# Patient Record
Sex: FEMALE | Race: WHITE | NOT HISPANIC OR LATINO
[De-identification: names, ages, dates, MRNs, and addresses within clinical notes are randomized per-mention and may not be internally consistent; named-entity substitution may affect disease eponyms.]

---

## 2018-03-20 ENCOUNTER — APPOINTMENT (OUTPATIENT)
Dept: OBGYN | Facility: CLINIC | Age: 43
End: 2018-03-20
Payer: MEDICAID

## 2018-03-20 VITALS
DIASTOLIC BLOOD PRESSURE: 70 MMHG | WEIGHT: 177 LBS | SYSTOLIC BLOOD PRESSURE: 110 MMHG | HEIGHT: 65 IN | BODY MASS INDEX: 29.49 KG/M2

## 2018-03-20 DIAGNOSIS — N93.9 ABNORMAL UTERINE AND VAGINAL BLEEDING, UNSPECIFIED: ICD-10-CM

## 2018-03-20 LAB
BILIRUB UR QL STRIP: NORMAL
GLUCOSE UR-MCNC: NORMAL
HCG UR QL: NORMAL EU/DL
HGB UR QL STRIP.AUTO: 250
KETONES UR-MCNC: NORMAL
LEUKOCYTE ESTERASE UR QL STRIP: 75
NITRITE UR QL STRIP: NORMAL
PH UR STRIP: 5
PROT UR STRIP-MCNC: NORMAL
SP GR UR STRIP: 1.02

## 2018-03-20 PROCEDURE — 81003 URINALYSIS AUTO W/O SCOPE: CPT | Mod: QW

## 2018-03-20 PROCEDURE — 99203 OFFICE O/P NEW LOW 30 MIN: CPT

## 2018-03-20 PROCEDURE — 76830 TRANSVAGINAL US NON-OB: CPT

## 2018-03-20 PROCEDURE — 76856 US EXAM PELVIC COMPLETE: CPT

## 2018-05-15 ENCOUNTER — RESULT REVIEW (OUTPATIENT)
Age: 43
End: 2018-05-15

## 2018-07-16 ENCOUNTER — APPOINTMENT (OUTPATIENT)
Dept: OBGYN | Facility: CLINIC | Age: 43
End: 2018-07-16
Payer: MEDICAID

## 2018-07-16 VITALS — DIASTOLIC BLOOD PRESSURE: 80 MMHG | BODY MASS INDEX: 29.62 KG/M2 | SYSTOLIC BLOOD PRESSURE: 130 MMHG | WEIGHT: 178 LBS

## 2018-07-16 DIAGNOSIS — S30.814A ABRASION OF VAGINA AND VULVA, INITIAL ENCOUNTER: ICD-10-CM

## 2018-07-16 PROCEDURE — 99213 OFFICE O/P EST LOW 20 MIN: CPT

## 2018-07-16 RX ORDER — NORETHINDRONE ACETATE 5 MG/1
5 TABLET ORAL DAILY
Qty: 21 | Refills: 0 | Status: ACTIVE | COMMUNITY
Start: 2018-07-16 | End: 1900-01-01

## 2018-07-19 ENCOUNTER — OUTPATIENT (OUTPATIENT)
Dept: OUTPATIENT SERVICES | Facility: HOSPITAL | Age: 43
LOS: 1 days | Discharge: HOME | End: 2018-07-19

## 2018-07-19 VITALS — HEIGHT: 65 IN | WEIGHT: 175.05 LBS

## 2018-07-19 DIAGNOSIS — N84.0 POLYP OF CORPUS UTERI: ICD-10-CM

## 2018-07-19 DIAGNOSIS — Z01.818 ENCOUNTER FOR OTHER PREPROCEDURAL EXAMINATION: ICD-10-CM

## 2018-07-19 DIAGNOSIS — N93.9 ABNORMAL UTERINE AND VAGINAL BLEEDING, UNSPECIFIED: ICD-10-CM

## 2018-07-19 LAB
ALBUMIN SERPL ELPH-MCNC: 4.2 G/DL — SIGNIFICANT CHANGE UP (ref 3.5–5.2)
ALP SERPL-CCNC: 66 U/L — SIGNIFICANT CHANGE UP (ref 30–115)
ALT FLD-CCNC: 14 U/L — SIGNIFICANT CHANGE UP (ref 0–41)
ANION GAP SERPL CALC-SCNC: 13 MMOL/L — SIGNIFICANT CHANGE UP (ref 7–14)
APPEARANCE UR: CLEAR — SIGNIFICANT CHANGE UP
APTT BLD: 31.6 SEC — SIGNIFICANT CHANGE UP (ref 27–39.2)
AST SERPL-CCNC: 16 U/L — SIGNIFICANT CHANGE UP (ref 0–41)
BASOPHILS # BLD AUTO: 0.03 K/UL — SIGNIFICANT CHANGE UP (ref 0–0.2)
BASOPHILS NFR BLD AUTO: 0.4 % — SIGNIFICANT CHANGE UP (ref 0–1)
BILIRUB SERPL-MCNC: 0.4 MG/DL — SIGNIFICANT CHANGE UP (ref 0.2–1.2)
BILIRUB UR-MCNC: NEGATIVE — SIGNIFICANT CHANGE UP
BUN SERPL-MCNC: 9 MG/DL — LOW (ref 10–20)
CALCIUM SERPL-MCNC: 9.6 MG/DL — SIGNIFICANT CHANGE UP (ref 8.5–10.1)
CHLORIDE SERPL-SCNC: 103 MMOL/L — SIGNIFICANT CHANGE UP (ref 98–110)
CO2 SERPL-SCNC: 25 MMOL/L — SIGNIFICANT CHANGE UP (ref 17–32)
COLOR SPEC: YELLOW — SIGNIFICANT CHANGE UP
CREAT SERPL-MCNC: 0.8 MG/DL — SIGNIFICANT CHANGE UP (ref 0.7–1.5)
DIFF PNL FLD: NEGATIVE — SIGNIFICANT CHANGE UP
EOSINOPHIL # BLD AUTO: 0.1 K/UL — SIGNIFICANT CHANGE UP (ref 0–0.7)
EOSINOPHIL NFR BLD AUTO: 1.5 % — SIGNIFICANT CHANGE UP (ref 0–8)
GLUCOSE SERPL-MCNC: 92 MG/DL — SIGNIFICANT CHANGE UP (ref 70–99)
GLUCOSE UR QL: NEGATIVE — SIGNIFICANT CHANGE UP
HCT VFR BLD CALC: 40.3 % — SIGNIFICANT CHANGE UP (ref 37–47)
HGB BLD-MCNC: 13.2 G/DL — SIGNIFICANT CHANGE UP (ref 12–16)
IMM GRANULOCYTES NFR BLD AUTO: 0.3 % — SIGNIFICANT CHANGE UP (ref 0.1–0.3)
INR BLD: 1.06 RATIO — SIGNIFICANT CHANGE UP (ref 0.65–1.3)
KETONES UR-MCNC: NEGATIVE — SIGNIFICANT CHANGE UP
LEUKOCYTE ESTERASE UR-ACNC: NEGATIVE — SIGNIFICANT CHANGE UP
LYMPHOCYTES # BLD AUTO: 2.41 K/UL — SIGNIFICANT CHANGE UP (ref 1.2–3.4)
LYMPHOCYTES # BLD AUTO: 35.6 % — SIGNIFICANT CHANGE UP (ref 20.5–51.1)
MCHC RBC-ENTMCNC: 28 PG — SIGNIFICANT CHANGE UP (ref 27–31)
MCHC RBC-ENTMCNC: 32.8 G/DL — SIGNIFICANT CHANGE UP (ref 32–37)
MCV RBC AUTO: 85.4 FL — SIGNIFICANT CHANGE UP (ref 81–99)
MONOCYTES # BLD AUTO: 0.45 K/UL — SIGNIFICANT CHANGE UP (ref 0.1–0.6)
MONOCYTES NFR BLD AUTO: 6.6 % — SIGNIFICANT CHANGE UP (ref 1.7–9.3)
NEUTROPHILS # BLD AUTO: 3.76 K/UL — SIGNIFICANT CHANGE UP (ref 1.4–6.5)
NEUTROPHILS NFR BLD AUTO: 55.6 % — SIGNIFICANT CHANGE UP (ref 42.2–75.2)
NITRITE UR-MCNC: NEGATIVE — SIGNIFICANT CHANGE UP
NRBC # BLD: 0 /100 WBCS — SIGNIFICANT CHANGE UP (ref 0–0)
PH UR: 6.5 — SIGNIFICANT CHANGE UP (ref 5–8)
PLATELET # BLD AUTO: 245 K/UL — SIGNIFICANT CHANGE UP (ref 130–400)
POTASSIUM SERPL-MCNC: 4.1 MMOL/L — SIGNIFICANT CHANGE UP (ref 3.5–5)
POTASSIUM SERPL-SCNC: 4.1 MMOL/L — SIGNIFICANT CHANGE UP (ref 3.5–5)
PROT SERPL-MCNC: 6.9 G/DL — SIGNIFICANT CHANGE UP (ref 6–8)
PROT UR-MCNC: NEGATIVE — SIGNIFICANT CHANGE UP
PROTHROM AB SERPL-ACNC: 11.4 SEC — SIGNIFICANT CHANGE UP (ref 9.95–12.87)
RBC # BLD: 4.72 M/UL — SIGNIFICANT CHANGE UP (ref 4.2–5.4)
RBC # FLD: 13.1 % — SIGNIFICANT CHANGE UP (ref 11.5–14.5)
SODIUM SERPL-SCNC: 141 MMOL/L — SIGNIFICANT CHANGE UP (ref 135–146)
SP GR SPEC: 1.02 — SIGNIFICANT CHANGE UP (ref 1.01–1.03)
UROBILINOGEN FLD QL: 0.2 — SIGNIFICANT CHANGE UP (ref 0.2–0.2)
WBC # BLD: 6.77 K/UL — SIGNIFICANT CHANGE UP (ref 4.8–10.8)
WBC # FLD AUTO: 6.77 K/UL — SIGNIFICANT CHANGE UP (ref 4.8–10.8)

## 2018-07-19 NOTE — H&P PST ADULT - HISTORY OF PRESENT ILLNESS
PATIENT CURRENTLY DENIES CHEST PAIN  SHORTNESS OF BREATH  PALPITATIONS,  DYSURIA, OR UPPER RESPIRATORY INFECTION IN PAST 2 WEEKS  EXERCISE  TOLERANCE  1-2 FLIGHT OF STAIRS  WITHOUT SHORTNESS OF BREATH

## 2018-07-19 NOTE — H&P PST ADULT - ATTENDING COMMENTS
43yo with abnormal uterine bleeding and uterine polyp who desires contraception in the form of an IUD.  Difficult exam in the office with poor visualization of the cervix due to excess vaginal tissue.   Presents today for PAP, hysteroscopy with polypectomy, D&C and Mirena IUD insertion.

## 2018-07-30 ENCOUNTER — LABORATORY RESULT (OUTPATIENT)
Age: 43
End: 2018-07-30

## 2018-07-30 ENCOUNTER — OUTPATIENT (OUTPATIENT)
Dept: OUTPATIENT SERVICES | Facility: HOSPITAL | Age: 43
LOS: 1 days | Discharge: HOME | End: 2018-07-30
Payer: MEDICAID

## 2018-07-30 ENCOUNTER — RESULT REVIEW (OUTPATIENT)
Age: 43
End: 2018-07-30

## 2018-07-30 ENCOUNTER — OUTPATIENT (OUTPATIENT)
Dept: OUTPATIENT SERVICES | Facility: HOSPITAL | Age: 43
LOS: 1 days | Discharge: HOME | End: 2018-07-30

## 2018-07-30 VITALS
SYSTOLIC BLOOD PRESSURE: 117 MMHG | HEART RATE: 72 BPM | RESPIRATION RATE: 16 BRPM | DIASTOLIC BLOOD PRESSURE: 80 MMHG | OXYGEN SATURATION: 99 %

## 2018-07-30 VITALS — OXYGEN SATURATION: 100 % | HEART RATE: 72 BPM | RESPIRATION RATE: 16 BRPM | TEMPERATURE: 98 F

## 2018-07-30 PROCEDURE — 58300 INSERT INTRAUTERINE DEVICE: CPT

## 2018-07-30 PROCEDURE — 58558 HYSTEROSCOPY BIOPSY: CPT

## 2018-07-30 RX ORDER — MORPHINE SULFATE 50 MG/1
2 CAPSULE, EXTENDED RELEASE ORAL
Qty: 0 | Refills: 0 | Status: DISCONTINUED | OUTPATIENT
Start: 2018-07-30 | End: 2018-07-30

## 2018-07-30 RX ORDER — NORETHINDRONE 0.35 MG/1
1 TABLET ORAL
Qty: 0 | Refills: 0 | COMMUNITY

## 2018-07-30 RX ORDER — KETOROLAC TROMETHAMINE 30 MG/ML
30 SYRINGE (ML) INJECTION ONCE
Qty: 0 | Refills: 0 | Status: DISCONTINUED | OUTPATIENT
Start: 2018-07-30 | End: 2018-07-30

## 2018-07-30 RX ORDER — OXYCODONE AND ACETAMINOPHEN 5; 325 MG/1; MG/1
2 TABLET ORAL EVERY 6 HOURS
Qty: 0 | Refills: 0 | Status: DISCONTINUED | OUTPATIENT
Start: 2018-07-30 | End: 2018-07-30

## 2018-07-30 RX ORDER — SODIUM CHLORIDE 9 MG/ML
1000 INJECTION, SOLUTION INTRAVENOUS
Qty: 0 | Refills: 0 | Status: DISCONTINUED | OUTPATIENT
Start: 2018-07-30 | End: 2018-08-14

## 2018-07-30 RX ORDER — ONDANSETRON 8 MG/1
4 TABLET, FILM COATED ORAL ONCE
Qty: 0 | Refills: 0 | Status: DISCONTINUED | OUTPATIENT
Start: 2018-07-30 | End: 2018-08-14

## 2018-07-30 RX ADMIN — SODIUM CHLORIDE 100 MILLILITER(S): 9 INJECTION, SOLUTION INTRAVENOUS at 10:25

## 2018-07-30 NOTE — ASU DISCHARGE PLAN (ADULT/PEDIATRIC). - ACTIVITY LEVEL
no tub baths/no heavy lifting/nothing per vagina/no intercourse/nothing per rectum/no tampons/no douching

## 2018-07-30 NOTE — BRIEF OPERATIVE NOTE - PROCEDURE
<<-----Click on this checkbox to enter Procedure Pap smear  07/30/2018    Active  KIRILL  IUD insertion  07/30/2018    Active  KIRILL  Hysteroscopic polypectomy using MyoSure tissue removal system  07/30/2018    Active  KIRILL  Hysteroscopy with dilation and curettage of uterus  07/30/2018    Active  KIRILL

## 2018-07-30 NOTE — ASU DISCHARGE PLAN (ADULT/PEDIATRIC). - SPECIAL INSTRUCTIONS
- Nothing in the vagina for 2 weeks - no sex, tampons, douching, tub baths or pools. May Shower.  - Follow up with Dr. Liu in 2 weeks  -In case of fever 100.4 or over, excessive bleeding, severe pain uncontrolled with meds, or leg pain, redness or swelling, please go to the emergency department

## 2018-07-30 NOTE — BRIEF OPERATIVE NOTE - OPERATION/FINDINGS
On exam, a cystocele and a rectocele with a normal looking cervix were noted. A small anteverted uterus was palpated. On hysteroscopy, an erythematous uterine cavity, with polypy looking protrusions were seen. Both ostia visualized. On exam, a cystocele and a rectocele with a normal looking cervix were noted. A small anteverted uterus was palpated. On hysteroscopy, an erythematous uterine cavity, with wispy pieces of tissue protruding into the cavity. Both ostia visualized.

## 2018-07-30 NOTE — BRIEF OPERATIVE NOTE - PRE-OP DX
Abnormal uterine bleeding (AUB)  07/30/2018    Active  Pepper Urbina  Endometrial polyp  07/30/2018    Active  Pepper Urbina

## 2018-07-30 NOTE — ASU DISCHARGE PLAN (ADULT/PEDIATRIC). - ASU FOLLOWUP
911 or go to the nearest Emergency Room [facility]:  [place] for Ambulatory Surgery...

## 2018-07-31 DIAGNOSIS — Z00.00 ENCOUNTER FOR GENERAL ADULT MEDICAL EXAMINATION WITHOUT ABNORMAL FINDINGS: ICD-10-CM

## 2018-08-01 PROBLEM — K21.9 GASTRO-ESOPHAGEAL REFLUX DISEASE WITHOUT ESOPHAGITIS: Chronic | Status: ACTIVE | Noted: 2018-07-19

## 2018-08-01 LAB — SURGICAL PATHOLOGY STUDY: SIGNIFICANT CHANGE UP

## 2018-08-02 DIAGNOSIS — N84.0 POLYP OF CORPUS UTERI: ICD-10-CM

## 2018-08-02 DIAGNOSIS — N93.9 ABNORMAL UTERINE AND VAGINAL BLEEDING, UNSPECIFIED: ICD-10-CM

## 2018-08-02 DIAGNOSIS — D25.0 SUBMUCOUS LEIOMYOMA OF UTERUS: ICD-10-CM

## 2018-08-02 DIAGNOSIS — Z30.430 ENCOUNTER FOR INSERTION OF INTRAUTERINE CONTRACEPTIVE DEVICE: ICD-10-CM

## 2018-08-15 ENCOUNTER — OUTPATIENT (OUTPATIENT)
Dept: OUTPATIENT SERVICES | Facility: HOSPITAL | Age: 43
LOS: 1 days | Discharge: HOME | End: 2018-08-15

## 2018-08-15 ENCOUNTER — APPOINTMENT (OUTPATIENT)
Dept: OBGYN | Facility: CLINIC | Age: 43
End: 2018-08-15
Payer: MEDICAID

## 2018-08-15 VITALS — BODY MASS INDEX: 29.45 KG/M2 | WEIGHT: 177 LBS

## 2018-08-15 DIAGNOSIS — Z09 ENCOUNTER FOR FOLLOW-UP EXAMINATION AFTER COMPLETED TREATMENT FOR CONDITIONS OTHER THAN MALIGNANT NEOPLASM: ICD-10-CM

## 2018-08-15 PROCEDURE — 99212 OFFICE O/P EST SF 10 MIN: CPT

## 2018-08-26 DIAGNOSIS — Z00.00 ENCOUNTER FOR GENERAL ADULT MEDICAL EXAMINATION WITHOUT ABNORMAL FINDINGS: ICD-10-CM

## 2019-02-14 ENCOUNTER — APPOINTMENT (OUTPATIENT)
Dept: OBGYN | Facility: CLINIC | Age: 44
End: 2019-02-14
Payer: MEDICAID

## 2019-02-14 PROCEDURE — 76830 TRANSVAGINAL US NON-OB: CPT

## 2019-03-18 DIAGNOSIS — Z00.00 ENCOUNTER FOR GENERAL ADULT MEDICAL EXAMINATION W/OUT ABNORMAL FINDINGS: ICD-10-CM

## 2019-06-28 ENCOUNTER — RX RENEWAL (OUTPATIENT)
Age: 44
End: 2019-06-28

## 2019-07-17 ENCOUNTER — RX RENEWAL (OUTPATIENT)
Age: 44
End: 2019-07-17

## 2019-07-17 RX ORDER — NORETHINDRONE ACETATE AND ETHINYL ESTRADIOL AND FERROUS FUMARATE 1.5-30(21)
1.5-3 KIT ORAL DAILY
Qty: 84 | Refills: 1 | Status: ACTIVE | COMMUNITY
Start: 2019-06-28 | End: 1900-01-01

## 2020-01-29 ENCOUNTER — APPOINTMENT (OUTPATIENT)
Dept: OBGYN | Facility: CLINIC | Age: 45
End: 2020-01-29

## 2020-01-29 LAB
BILIRUB UR QL STRIP: NORMAL
GLUCOSE UR-MCNC: NORMAL
HCG UR QL: NORMAL EU/DL
HGB UR QL STRIP.AUTO: NORMAL
KETONES UR-MCNC: NORMAL
LEUKOCYTE ESTERASE UR QL STRIP: NORMAL
NITRITE UR QL STRIP: NORMAL
PH UR STRIP: 6
PROT UR STRIP-MCNC: NORMAL
SP GR UR STRIP: 1.02

## 2021-06-20 ENCOUNTER — EMERGENCY (EMERGENCY)
Facility: HOSPITAL | Age: 46
LOS: 0 days | Discharge: HOME | End: 2021-06-20
Attending: EMERGENCY MEDICINE | Admitting: EMERGENCY MEDICINE
Payer: MEDICAID

## 2021-06-20 VITALS
SYSTOLIC BLOOD PRESSURE: 150 MMHG | RESPIRATION RATE: 18 BRPM | TEMPERATURE: 98 F | HEIGHT: 65 IN | DIASTOLIC BLOOD PRESSURE: 100 MMHG | HEART RATE: 96 BPM | OXYGEN SATURATION: 98 %

## 2021-06-20 VITALS
HEART RATE: 69 BPM | DIASTOLIC BLOOD PRESSURE: 85 MMHG | RESPIRATION RATE: 18 BRPM | OXYGEN SATURATION: 96 % | TEMPERATURE: 98 F | SYSTOLIC BLOOD PRESSURE: 123 MMHG

## 2021-06-20 DIAGNOSIS — S62.616A DISPLACED FRACTURE OF PROXIMAL PHALANX OF RIGHT LITTLE FINGER, INITIAL ENCOUNTER FOR CLOSED FRACTURE: ICD-10-CM

## 2021-06-20 DIAGNOSIS — S62.614A DISPLACED FRACTURE OF PROXIMAL PHALANX OF RIGHT RING FINGER, INITIAL ENCOUNTER FOR CLOSED FRACTURE: ICD-10-CM

## 2021-06-20 DIAGNOSIS — S80.212A ABRASION, LEFT KNEE, INITIAL ENCOUNTER: ICD-10-CM

## 2021-06-20 DIAGNOSIS — K21.9 GASTRO-ESOPHAGEAL REFLUX DISEASE WITHOUT ESOPHAGITIS: ICD-10-CM
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
## 2021-06-20 DIAGNOSIS — Z88.8 ALLERGY STATUS TO OTHER DRUGS, MEDICAMENTS AND BIOLOGICAL SUBSTANCES STATUS: ICD-10-CM
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
## 2021-06-20 DIAGNOSIS — M79.641 PAIN IN RIGHT HAND: ICD-10-CM

## 2021-06-20 PROCEDURE — 73110 X-RAY EXAM OF WRIST: CPT | Mod: 26,RT

## 2021-06-20 PROCEDURE — 73560 X-RAY EXAM OF KNEE 1 OR 2: CPT | Mod: 26,LT

## 2021-06-20 PROCEDURE — 99284 EMERGENCY DEPT VISIT MOD MDM: CPT

## 2021-06-20 PROCEDURE — 73060 X-RAY EXAM OF HUMERUS: CPT | Mod: 26,RT

## 2021-06-20 PROCEDURE — 73090 X-RAY EXAM OF FOREARM: CPT | Mod: 26,RT

## 2021-06-20 PROCEDURE — 73130 X-RAY EXAM OF HAND: CPT | Mod: 26,RT

## 2021-06-20 PROCEDURE — 73080 X-RAY EXAM OF ELBOW: CPT | Mod: 26,RT

## 2021-06-20 PROCEDURE — 73130 X-RAY EXAM OF HAND: CPT | Mod: 26,RT,77

## 2021-06-20 RX ORDER — HYDROMORPHONE HYDROCHLORIDE 2 MG/ML
0.5 INJECTION INTRAMUSCULAR; INTRAVENOUS; SUBCUTANEOUS ONCE
Refills: 0 | Status: DISCONTINUED | OUTPATIENT
Start: 2021-06-20 | End: 2021-06-20

## 2021-06-20 RX ORDER — MORPHINE SULFATE 50 MG/1
4 CAPSULE, EXTENDED RELEASE ORAL ONCE
Refills: 0 | Status: DISCONTINUED | OUTPATIENT
Start: 2021-06-20 | End: 2021-06-20

## 2021-06-20 RX ORDER — AMOXICILLIN 250 MG/5ML
1 SUSPENSION, RECONSTITUTED, ORAL (ML) ORAL
Qty: 14 | Refills: 0
Start: 2021-06-20 | End: 2021-06-26

## 2021-06-20 RX ORDER — HYDROMORPHONE HYDROCHLORIDE 2 MG/ML
1 INJECTION INTRAMUSCULAR; INTRAVENOUS; SUBCUTANEOUS ONCE
Refills: 0 | Status: DISCONTINUED | OUTPATIENT
Start: 2021-06-20 | End: 2021-06-20

## 2021-06-20 RX ADMIN — MORPHINE SULFATE 4 MILLIGRAM(S): 50 CAPSULE, EXTENDED RELEASE ORAL at 14:12

## 2021-06-20 RX ADMIN — HYDROMORPHONE HYDROCHLORIDE 0.5 MILLIGRAM(S): 2 INJECTION INTRAMUSCULAR; INTRAVENOUS; SUBCUTANEOUS at 15:52

## 2021-06-20 RX ADMIN — HYDROMORPHONE HYDROCHLORIDE 1 MILLIGRAM(S): 2 INJECTION INTRAMUSCULAR; INTRAVENOUS; SUBCUTANEOUS at 17:26

## 2021-06-20 NOTE — CONSULT NOTE ADULT - CONSULT REASON
fractures to right ring finger and small finger
RIGHT COMMINUTED FOURTH PROXIMAL PHALANX FRACTURE AND ANGULATED FIFTH PROMIXAL PHALANX FRACTURE

## 2021-06-20 NOTE — ED PROVIDER NOTE - OBJECTIVE STATEMENT
46 y/lo female with no significant PMH presents to the ED for evaluation of right hand pain s/p tripping and falling landing on outstretched hands while playing baseball earlier today. pt is left hand dominant. pt reports tingling in her right hand. pt denies mild left knee pain. pt was given fentanyl by EMS with temporary relief. pt denies loc, head injury, headache, weakness, or numbness.

## 2021-06-20 NOTE — PROCEDURE NOTE - NSICDXPROCEDURE_GEN_ALL_CORE_FT
PROCEDURES:  Closed reduction of fracture of distal phalanx of hand 20-Jun-2021 19:54:14 4th and 5th digits Selma Bobo

## 2021-06-20 NOTE — ED PROVIDER NOTE - NS ED ROS FT
CONST: No fever, chills or bodyaches  EYES: No pain, redness, drainage or visual changes.  ENT: No ear pain or discharge, nasal discharge or congestion. No sore throat  CARD: No chest pain, palpitations  RESP: No SOB, cough, hemoptysis. No hx of asthma or COPD  GI: No abdominal pain, N/V/D  : No urinary symptoms  MS: (+) right hand pain   SKIN: No rashes  NEURO: No headache, dizziness, paresthesias or LOC

## 2021-06-20 NOTE — ED PROVIDER NOTE - PHYSICAL EXAMINATION
Physical Exam    Vital Signs: I have reviewed the initial vital signs.  Constitutional: well-nourished, appears stated age, no acute distress  Eyes: Conjunctiva pink, Sclera clear  Cardiovascular: S1 and S2, regular rate, regular rhythm, well-perfused extremities, radial pulses equal and 2+ b/l. good capillary refill <2 seconds.   Respiratory: unlabored respiratory effort, clear to auscultation bilaterally no wheezing, rales and rhonchi. pt is speaking full sentences. no accessory muscle use.   Gastrointestinal: soft, non-tender, nondistended abdomen, no pulsatile mass, normal bowl sounds, no rebound, no guarding, no organomegaly.   Musculoskeletal: (+) deformity of the right 4th phalange with ventral deviation of the right 4th finger. ecchymosis to the palmar aspect of 3-5th phalanges. tenderness over fingers 3-5. limited rom of the right digits due to pain.   Integumentary: warm, dry, no rash  Neurologic: awake, alert, cranial nerves II-XII grossly intact, extremities’ motor and sensory functions. median, radial, and ulnar nerve motor and senory function intact b/l; however limited motor function in the right last 3 digits due to pain.   Psychiatric: appropriate mood, appropriate affect

## 2021-06-20 NOTE — ED PROVIDER NOTE - CARE PROVIDER_API CALL
Kristi Wells)  [facility]  [address] [place] [address], [address]  [place] [address]  Phone: [phone]  Fax: [phone]  Follow Up Time: 1-3 Days

## 2021-06-20 NOTE — ED PROVIDER NOTE - PATIENT PORTAL LINK FT
You can access the FollowMyHealth Patient Portal offered by [facility] by registering at the following website: [email or web address]. By joining [employer]’s FollowMyHealth portal, you will also be able to view your health information using other applications (apps) compatible with our system.

## 2021-06-20 NOTE — ED PROVIDER NOTE - CARE PROVIDERS DIRECT ADDRESSES
[email or web address]

## 2021-06-20 NOTE — CONSULT NOTE ADULT - ATTENDING COMMENTS
d/w surgery consult resident  recommend ED closed reduction and splint immobilization  hand elevation and pain control  follow up in [facility] office 6/21/21 for surgical planning

## 2021-06-20 NOTE — CONSULT NOTE ADULT - SUBJECTIVE AND OBJECTIVE BOX
Orthopaedics Consult Note    ALEXANDER RIOS  [id number]  Ph:  [phone]      Patient is a 46y old LHD Female with right hand pain after sliding into base while playing softball earlier today. Reported immediate pain and deformity at ring finger and small finger.  General surgery called while initially on hand call, however, ortho called to assist. Isolated injury, denies pain elsewhere in extremity. Denies numbness/tingling/radiating pain.      PMH/PSH  GERD (gastroesophageal reflux disease)    Finger fracture, right    No significant past surgical history    R HAND ARM INJURY    Finger dislocation, initial encounter    SysAdmin_VisitLink        Medications      Allergies  No Known Allergies  steroids - heart races (Other)        T(C): 36.4 (06-20-21 @ 15:23), Max: 36.7 (06-20-21 @ 13:02)  HR: 69 (06-20-21 @ 15:23) (69 - 96)  BP: 123/85 (06-20-21 @ 15:23) (123/85 - 150/100)  RR: 18 (06-20-21 @ 15:23) (18 - 18)  SpO2: 96% (06-20-21 @ 15:23) (96% - 98%)    Physical Exam  NAD  Breathing comfortably on RA  Resting comfortably    RUE  skin intact  (+) swelling and ecchymosis at RF/SF proximal phalanx and MCP  (+)malrotation at RF/SF  unable to make full composite fist  Motor: AIN/PIN/Ulnar intact  Sensory: Ax/M/R/U intact all fingers  Vasc: hand WWP, palpable radial pulse, CR<2sec    LUE XR:  fractures at proximal phalanx of ring finger and small finger; RF small finger prox phalanx fracture extending intra-articularly with significant dorsal angulation; SF prox phalanx transverse fracture w/ mild dorsal angulation    Procedure  Ulnar nerve block was performed and Left RF/SF proximal phalanx fractures were closed reduced and intrinsic plus splint applied    A/P: Patient is a 46year old RHD Female with proximal phalanx fractures to the left ring finger and small finger; ortho called to assist w/ closed reduction for general surgery patient    -s/p closed reduction and splinting as above  - splint care instructions provided; must keep clean/dry/intact  - nonweightbearing to LUE  - patient counseled that her fractures are unstable and will likely require operative fixation in order to restore stability, improve function, and reduce pain.  This can be further discussed and performed on an outpatient basis  - pain control PRN  - ice/elevation  - may follow up as outpatient w/ Dr. Wells (Plastic and Reconstructive Surgery) this week, must call  for appointment  - if patient desires, she may f/u with Dr. Aaron Haque or Dr. Shanda Cortes (Orthopedic Hand Surgery) this week, must call  for appointment

## 2021-06-20 NOTE — CONSULT NOTE ADULT - ASSESSMENT
ASSESSMENT:  46yF w/ PMHx of  fall who presented with right hand pain found to have Acute displaced intra-articular comminuted fracture of the fourth proximal phalanx, and acute angulated fracture of the fifth proximal phalanx.    PLAN:  -4th and 5th digit fractures reduced at bedside   -Intrinsic plus volar splint with dorsal support placed   -Post reduction films demonstrate realignment of the digits   -Patient should follow-up in Dr. Wells's office tomorrow, 6/21, for evaluation and plan for surgical repair  -Adequate pain control on discharge   -PO antibiotics   -Keep RUE elevated     Above plan discussed with Attending Surgeon Dr. Wells , patient, patient family, and ED

## 2021-06-20 NOTE — ED PROVIDER NOTE - CLINICAL SUMMARY MEDICAL DECISION MAKING FREE TEXT BOX
pt seen by hand c/s, who asked ortho to assist with reduction and splint, dc home w f/u Dr Wells tomorrow, ger c/s rec dc home w abx. pain meds also sent. strict return precautions provided

## 2021-06-20 NOTE — CONSULT NOTE ADULT - SUBJECTIVE AND OBJECTIVE BOX
GENERAL SURGERY CONSULT NOTE    Patient: ALEXANDER RIOS , 46y ([birth year])Female   MRN: [id number]  Location: [facility] ED  Visit: 06-20-21 Emergency  Date: 06-20-21 @ 18:48    HPI: 46 year old female who fell on outstretched hand while playing baseball complains of right 4th and 5th digit pain, she states when she was sliding forward on her hands she saw the fingers dislocate.        PAST MEDICAL & SURGICAL HISTORY:  GERD (gastroesophageal reflux disease)    No significant past surgical history      VITALS:  T(F): 97.6 (06-20-21 @ 15:23), Max: 98 (06-20-21 @ 13:02)  HR: 69 (06-20-21 @ 15:23) (69 - 96)  BP: 123/85 (06-20-21 @ 15:23) (123/85 - 150/100)  RR: 18 (06-20-21 @ 15:23) (18 - 18)  SpO2: 96% (06-20-21 @ 15:23) (96% - 98%)    PHYSICAL EXAM:  General: NAD, AAOx3, calm and cooperative  HEENT: NCAT, EOMI, Trachea ML, Neck supple  Respiratory:  normal respiratory effort,   Musculoskeletal: right 4th and 5th digit base with significant swelling, ecchymosis and possible dislocation, tender to touch, capillary refill intact, ROM limited secondary to pain and swelling  Neuro: Sensation grossly intact and equal throughout, no focal deficits  Vascular: Pulses 2+ throughout, extremities well perfused      LAB/STUDIES:    IMAGING:    r< from: Xray Hand 3 Views, Right (06.20.21 @ 14:26) >  Impression:  Acute displaced intra-articular comminuted fracture of the fourth proximal phalanx, and acute angulated fracture of the fifth proximal phalanx.    < end of copied text >        ASSESSMENT:  46yF w/ PMHx of  fall who presented with right hand pain found to have Acute displaced intra-articular comminuted fracture of the fourth proximal phalanx, and acute angulated fracture of the fifth proximal phalanx.    PLAN:  -to reduce and splint at bedside  -f/u post reduction films   -    Above plan discussed with Attending Surgeon Dr. Wells , patient, patient family, and ED  06-20-21 @ 18:48   GENERAL SURGERY CONSULT NOTE    Patient: ALEXANDER RIOS , 46y ([birth year])Female   MRN: [id number]  Location: [facility] ED  Visit: 06-20-21 Emergency  Date: 06-20-21 @ 18:48    HPI: 46 year old female who fell on outstretched hand while playing baseball complains of right 4th and 5th digit pain, she states when she was sliding forward on her hands she saw the fingers dislocate.      PAST MEDICAL & SURGICAL HISTORY:  GERD (gastroesophageal reflux disease)    No significant past surgical history    VITALS:  T(F): 97.6 (06-20-21 @ 15:23), Max: 98 (06-20-21 @ 13:02)  HR: 69 (06-20-21 @ 15:23) (69 - 96)  BP: 123/85 (06-20-21 @ 15:23) (123/85 - 150/100)  RR: 18 (06-20-21 @ 15:23) (18 - 18)  SpO2: 96% (06-20-21 @ 15:23) (96% - 98%)    PHYSICAL EXAM:  General: NAD, AAOx3, calm and cooperative  HEENT: NCAT, EOMI, Trachea ML, Neck supple  Respiratory:  normal respiratory effort,   Musculoskeletal: right 4th and 5th digit base with significant swelling, ecchymosis and possible dislocation, tender to touch, capillary refill intact, ROM limited secondary to pain and swelling  Neuro: Sensation grossly intact and equal throughout, no focal deficits  Vascular: Pulses 2+ throughout, extremities well perfused    IMAGING:  < from: Xray Hand 3 Views, Right (06.20.21 @ 14:26) >  Impression:  Acute displaced intra-articular comminuted fracture of the fourth proximal phalanx, and acute angulated fracture of the fifth proximal phalanx.

## 2021-06-20 NOTE — ED PROVIDER NOTE - PROGRESS NOTE DETAILS
ortho recommends hand surg consult. gen surg covering hand consulted. FF: pt seen by gen surg and orthopedics. fingers reduced, and pt placed in sugar tong splint. pt has an appt with dr. quintanilla in the office tomorrow. rx pain medications. rx abx recommended by hand. advised of strict return precautions discussed at bedside. agreeable to dc.

## 2021-06-20 NOTE — ED PROVIDER NOTE - CARE PLAN
Principal Discharge DX:	Finger fracture, right  Secondary Diagnosis:	Finger dislocation, initial encounter

## 2021-06-20 NOTE — ED PROVIDER NOTE - WORK/EXCUSE FORM DATE
I concur with the Admission Order and I certify that services are provided in accordance with Section 42 CFR § 412.3 20-Jun-2021

## 2021-06-20 NOTE — PROCEDURE NOTE - NSPERIPVASCEVA_GEN_A_CORE
fingers/toes warm to touch/no paresthesia/swelling/no cyanosis of extremity/pre-application: responses intact/post-application: responses intact

## 2021-06-20 NOTE — ED PROVIDER NOTE - ATTENDING CONTRIBUTION TO CARE
46F no pmh, p/w R hand pain x 1 day. sharp constant nonradiating, worse w movement or palpation. states she was playing softball, slid to second base and felt her R 4th finger go up towards her head and her knuckle went down. unable to range her R 4th digit. no chi or loc. sustained abrasion to L knee. able to bear weight on knee. BIBEMS and received fentanyl en route.  no cp, sob. L hand dominant.    on exam, AFVSS, well alka nad, ncat, eomi, perrla, mmm, lctab, rrr nl s1s2 no mrg, abd soft ntnd, aaox3, no focal deficits, no le edema or calf ttp, L knee abrasion mild ttp, 5/5 motor, silt, 2+ dp pulse. R hand digit #4 deformity, ttp at MCP, bruising, skin intact, unable to range R 4th digit, 2+ radial pulse    a/p; R hand injury, concern for fx/dislocation w tendon injury. will get XR, pain control, ortho consult 46F no pmh, p/w R hand pain x 1 day. sharp constant nonradiating, worse w movement or palpation. states she was playing softball, slid to second base and felt her R 4th finger go up towards her head and her knuckle went down. unable to range her R 4th digit. no chi or loc. sustained abrasion to L knee. able to bear weight on knee. BIBEMS and received fentanyl 80 mcg en route.  no cp, sob. L hand dominant.    on exam, AFVSS, well alka nad, ncat, eomi, perrla, mmm, lctab, rrr nl s1s2 no mrg, abd soft ntnd, aaox3, no focal deficits, no le edema or calf ttp, L knee abrasion mild ttp, 5/5 motor, silt, 2+ dp pulse. R hand digit #4 deformity, ttp at MCP, bruising, skin intact, unable to range R 4th digit, 2+ radial pulse    a/p; R hand injury, concern for fx/dislocation w tendon injury. will get XR, pain control, ortho consult

## 2021-07-28 ENCOUNTER — RESULT REVIEW (OUTPATIENT)
Age: 46
End: 2021-07-28

## 2021-07-28 ENCOUNTER — OUTPATIENT (OUTPATIENT)
Dept: OUTPATIENT SERVICES | Facility: HOSPITAL | Age: 46
LOS: 1 days | Discharge: HOME | End: 2021-07-28
Payer: MEDICAID

## 2021-07-28 DIAGNOSIS — N63.20 UNSPECIFIED LUMP IN THE LEFT BREAST, UNSPECIFIED QUADRANT: ICD-10-CM

## 2021-07-28 PROCEDURE — 77062 BREAST TOMOSYNTHESIS BI: CPT | Mod: 26

## 2021-07-28 PROCEDURE — 77066 DX MAMMO INCL CAD BI: CPT | Mod: 26

## 2021-07-28 PROCEDURE — 76642 ULTRASOUND BREAST LIMITED: CPT | Mod: 26,LT

## 2022-02-25 NOTE — ED ADULT NURSE NOTE - NSFALLRSKASSESASSIST_ED_ALL_ED
Advised on diet and exercise and BMI; reviewed vaccines; reviewed screening guidelines and recommendations; defers HIV/Hep C screening for now; boy  form completed for patient and physical within acceptable limits
no

## 2022-09-16 ENCOUNTER — OUTPATIENT (OUTPATIENT)
Dept: OUTPATIENT SERVICES | Facility: HOSPITAL | Age: 47
LOS: 1 days | Discharge: HOME | End: 2022-09-16

## 2022-09-16 DIAGNOSIS — M79.641 PAIN IN RIGHT HAND: ICD-10-CM

## 2022-09-16 PROCEDURE — 76882 US LMTD JT/FCL EVL NVASC XTR: CPT | Mod: 26,RT

## 2024-02-08 NOTE — ED PROVIDER NOTE - WR ORDER NAME 2
----- Message from Robinson Horn MD sent at 2/8/2024  2:35 PM CST -----  Schedule patient for follow up of test results, would like to start her on medications, in particular for her low bone density  
Called pt; pt answered    Scheduled appt as directed   Permission granted to override slot   
Xray Humerus, Right

## 2024-07-23 ENCOUNTER — EMERGENCY (EMERGENCY)
Facility: HOSPITAL | Age: 49
LOS: 1 days | Discharge: HOME | End: 2024-07-23
Attending: EMERGENCY MEDICINE | Admitting: STUDENT IN AN ORGANIZED HEALTH CARE EDUCATION/TRAINING PROGRAM
Payer: COMMERCIAL

## 2024-07-23 VITALS
HEART RATE: 76 BPM | RESPIRATION RATE: 18 BRPM | DIASTOLIC BLOOD PRESSURE: 86 MMHG | TEMPERATURE: 98 F | SYSTOLIC BLOOD PRESSURE: 139 MMHG | OXYGEN SATURATION: 98 %

## 2024-07-23 DIAGNOSIS — R10.31 RIGHT LOWER QUADRANT PAIN: ICD-10-CM

## 2024-07-23 DIAGNOSIS — I10 ESSENTIAL (PRIMARY) HYPERTENSION: ICD-10-CM

## 2024-07-23 LAB
ALBUMIN SERPL ELPH-MCNC: 4.6 G/DL — SIGNIFICANT CHANGE UP (ref 3.3–5)
ALP SERPL-CCNC: 114 U/L — SIGNIFICANT CHANGE UP (ref 40–120)
ALT FLD-CCNC: 25 U/L — SIGNIFICANT CHANGE UP (ref 10–45)
ANION GAP SERPL CALC-SCNC: 11 MMOL/L — SIGNIFICANT CHANGE UP (ref 5–17)
APPEARANCE UR: CLEAR — SIGNIFICANT CHANGE UP
APTT BLD: 34.8 SEC — SIGNIFICANT CHANGE UP (ref 24.5–35.6)
AST SERPL-CCNC: 27 U/L — SIGNIFICANT CHANGE UP (ref 10–40)
BILIRUB SERPL-MCNC: 0.2 MG/DL — SIGNIFICANT CHANGE UP (ref 0.2–1.2)
BILIRUB UR-MCNC: NEGATIVE — SIGNIFICANT CHANGE UP
BLD GP AB SCN SERPL QL: NEGATIVE — SIGNIFICANT CHANGE UP
BUN SERPL-MCNC: 12 MG/DL — SIGNIFICANT CHANGE UP (ref 7–23)
CALCIUM SERPL-MCNC: 10 MG/DL — SIGNIFICANT CHANGE UP (ref 8.4–10.5)
CHLORIDE SERPL-SCNC: 102 MMOL/L — SIGNIFICANT CHANGE UP (ref 96–108)
CO2 SERPL-SCNC: 28 MMOL/L — SIGNIFICANT CHANGE UP (ref 22–31)
COLOR SPEC: YELLOW — SIGNIFICANT CHANGE UP
CREAT SERPL-MCNC: 0.7 MG/DL — SIGNIFICANT CHANGE UP (ref 0.5–1.3)
DIFF PNL FLD: NEGATIVE — SIGNIFICANT CHANGE UP
EGFR: 107 ML/MIN/1.73M2 — SIGNIFICANT CHANGE UP
GLUCOSE SERPL-MCNC: 91 MG/DL — SIGNIFICANT CHANGE UP (ref 70–99)
GLUCOSE UR QL: NEGATIVE MG/DL — SIGNIFICANT CHANGE UP
HCG SERPL-ACNC: 1 MIU/ML — SIGNIFICANT CHANGE UP
HCT VFR BLD CALC: 42.1 % — SIGNIFICANT CHANGE UP (ref 34.5–45)
HGB BLD-MCNC: 13.8 G/DL — SIGNIFICANT CHANGE UP (ref 11.5–15.5)
INR BLD: 0.94 — SIGNIFICANT CHANGE UP (ref 0.85–1.18)
KETONES UR-MCNC: ABNORMAL MG/DL
LACTATE SERPL-SCNC: 1.2 MMOL/L — SIGNIFICANT CHANGE UP (ref 0.5–2)
LEUKOCYTE ESTERASE UR-ACNC: NEGATIVE — SIGNIFICANT CHANGE UP
LIDOCAIN IGE QN: 33 U/L — SIGNIFICANT CHANGE UP (ref 7–60)
MCHC RBC-ENTMCNC: 27.9 PG — SIGNIFICANT CHANGE UP (ref 27–34)
MCHC RBC-ENTMCNC: 32.8 GM/DL — SIGNIFICANT CHANGE UP (ref 32–36)
MCV RBC AUTO: 85.2 FL — SIGNIFICANT CHANGE UP (ref 80–100)
NITRITE UR-MCNC: NEGATIVE — SIGNIFICANT CHANGE UP
NRBC # BLD: 0 /100 WBCS — SIGNIFICANT CHANGE UP (ref 0–0)
PH UR: 5.5 — SIGNIFICANT CHANGE UP (ref 5–8)
PLATELET # BLD AUTO: 262 K/UL — SIGNIFICANT CHANGE UP (ref 150–400)
POTASSIUM SERPL-MCNC: 4.5 MMOL/L — SIGNIFICANT CHANGE UP (ref 3.5–5.3)
POTASSIUM SERPL-SCNC: 4.5 MMOL/L — SIGNIFICANT CHANGE UP (ref 3.5–5.3)
PROT SERPL-MCNC: 8.2 G/DL — SIGNIFICANT CHANGE UP (ref 6–8.3)
PROT UR-MCNC: NEGATIVE MG/DL — SIGNIFICANT CHANGE UP
PROTHROM AB SERPL-ACNC: 10.7 SEC — SIGNIFICANT CHANGE UP (ref 9.5–13)
RBC # BLD: 4.94 M/UL — SIGNIFICANT CHANGE UP (ref 3.8–5.2)
RBC # FLD: 13.5 % — SIGNIFICANT CHANGE UP (ref 10.3–14.5)
RH IG SCN BLD-IMP: POSITIVE — SIGNIFICANT CHANGE UP
SODIUM SERPL-SCNC: 141 MMOL/L — SIGNIFICANT CHANGE UP (ref 135–145)
SP GR SPEC: 1.01 — SIGNIFICANT CHANGE UP (ref 1–1.03)
UROBILINOGEN FLD QL: 0.2 MG/DL — SIGNIFICANT CHANGE UP (ref 0.2–1)
WBC # BLD: 8.32 K/UL — SIGNIFICANT CHANGE UP (ref 3.8–10.5)
WBC # FLD AUTO: 8.32 K/UL — SIGNIFICANT CHANGE UP (ref 3.8–10.5)

## 2024-07-23 PROCEDURE — 99285 EMERGENCY DEPT VISIT HI MDM: CPT

## 2024-07-23 PROCEDURE — 74177 CT ABD & PELVIS W/CONTRAST: CPT | Mod: 26,MC

## 2024-07-23 PROCEDURE — 76830 TRANSVAGINAL US NON-OB: CPT | Mod: 26

## 2024-07-23 PROCEDURE — 76856 US EXAM PELVIC COMPLETE: CPT | Mod: 26

## 2024-07-23 RX ORDER — SODIUM CHLORIDE 0.9 % (FLUSH) 0.9 %
1000 SYRINGE (ML) INJECTION ONCE
Refills: 0 | Status: COMPLETED | OUTPATIENT
Start: 2024-07-23 | End: 2024-07-23

## 2024-07-23 RX ORDER — ACETAMINOPHEN 325 MG
650 TABLET ORAL ONCE
Refills: 0 | Status: COMPLETED | OUTPATIENT
Start: 2024-07-23 | End: 2024-07-23

## 2024-07-23 RX ADMIN — Medication 650 MILLIGRAM(S): at 19:22

## 2024-07-23 RX ADMIN — Medication 1000 MILLILITER(S): at 18:17

## 2024-07-23 NOTE — ED ADULT TRIAGE NOTE - NSWEIGHTCALCTOOLDRUG_GEN_A_CORE
General Call    Contacts       Type Contact Phone/Fax    05/24/2023 11:49 AM CDT Phone (Incoming) Chrystal Solis (Self) [phone] (M)        Reason for Call:  Refills & Updates    What are your questions or concerns: Patient has a few health concerns he wanted to discuss as well as looking over refills for his prescriptions; he also requested maybe wanting a blood panel done. He was hoping to get in sooner for his annual/wellness but Anne is booked until the end of October and he refused to see any other provider and did not want to book anything virtual. He would like to receive a call from either Anne or Anne's care team. Thank you.    Date of last appointment with provider: 02/2023    Could we send this information to you in [employer] or would you prefer to receive a phone call?:   Patient would prefer a phone call   Okay to leave a detailed message?: Yes at Cell number on file:    Telephone Information:   Mobile [phone]       used

## 2024-07-23 NOTE — ED PROVIDER NOTE - PROGRESS NOTE DETAILS
Klepfish: labs grossly wnl. UA trace ketone, no infection. CT w/ no acute pathology. Surgery recommending TVUS. Updated pt/ at bedside. Will reassess. Klepfish: Pt still w/ mild "pressure, not pain" to RLQ, minimal when laying flat, worse w/ standing. Does not want any additional pain meds. Pt just went to US.   Pending: US, rediscussed w/ surgery, reassessment. Likely outpt f/u if no significant pathology. MK–CT is negative without hernia or appendicitis ultrasound negative no evidence of torsion and clinically patient does not have any gynecologic complaints reproducible soft tissue tenderness at the right lower rectus and transverse abdominis muscle point-of-care ultrasound shows some small amount of trace edema in the area of maximal tenderness  patient given lidocaine patch Toradol small dose of Valium feeling improved will advise for close follow-up with Dr. Johanny bazan return precautions stable for discharge advised not to engage in further abdominal wall exercises and apply cool compresses can also take sitz bath's with Epsom suresh

## 2024-07-23 NOTE — ED PROVIDER NOTE - PATIENT PORTAL LINK FT
You can access the FollowMyHealth Patient Portal offered by [facility] by registering at the following website: [email or web address]. By joining [employer]’s FollowMyHealth portal, you will also be able to view your health information using other applications (apps) compatible with our system.

## 2024-07-23 NOTE — ED PROVIDER NOTE - CLINICAL SUMMARY MEDICAL DECISION MAKING FREE TEXT BOX
49F PMH HTN p/w RLQ pain. Pt states that she works out several times a week. Noticed that for last 2-3 months, every time she works out she developed sharp RLQ pain, however always resolves once the work out is over. Today she had similar pain while working out but was worse than usual, also lasting much longer than usual. Went to  and was referred to ED. States that  was in touch w/ Dr. Bolanos. No other systemic symptoms.   Vitals wnl, exam as above.   Labs sent prior to my eval grossly wnl.   CT and IVF also ordered prior to my eval.  ddx: appy vs. ovarian cyst vs. less likely torsion vs. renal colic vs. hernia vs. MSK strain.   Tylenol.  Will reassess. Possible US if still symptomatic and no clear etiology.

## 2024-07-23 NOTE — ED PROVIDER NOTE - OBJECTIVE STATEMENT
49F PMH HTN p/w RLQ pain. Pt states that she works out several times a week. Noticed that for last 2-3 months, every time she works out she developed sharp RLQ pain, however always resolves once the work out is over. Today she had similar pain while working out but was worse than usual, also lasting much longer than usual. Went to  and was referred to ED. States that  was in touch w/ Dr. Bolanos. No other systemic symptoms.   IUD.   Denies fevers, chills, nausea, vomiting, diarrhea, black stool, bloody stool, dysuria, hematuria, urinary frequency, focal weakness, focal numbness, lightheadedness, back pain, SOB, CP, rhinorrhea, nasal congestion, sore throat, cough.

## 2024-07-23 NOTE — ED PROVIDER NOTE - PHYSICAL EXAMINATION
abd: soft, RLQ ttp, mild guarding, no rebound.   no LE edema, normal equal distal pulses, steady unassisted gait.

## 2024-07-23 NOTE — ED ADULT NURSE REASSESSMENT NOTE - NS ED NURSE REASSESS COMMENT FT1
Patient care endorsed from prior RN, patient received resting on the stretcher, axox3, spont breathing on RA. Pt endorsing abd pain with intermittent nausea, denies vomiting. Denies chest pain/sob, made aware that she is awaiting US at this time, verbalized understanding. Vitals as charted.

## 2024-07-23 NOTE — ED ADULT NURSE NOTE - OBJECTIVE STATEMENT
Pt. a&ox4 ambulatory with steady gait, has [place] IUD in place since 2018, comes to ED c/o sudden onset RLQ pain since 930 am this morning. Pt. was finishing her workout at the gym when the pain began and she "felt something shift", assuming it was her IUD, has had that pain/sensation before, which eventually and quickly resolves itself, except this time the pain continued to progressively worsen throughout the day. Denies f/c, Body aches, n/v/d, cp, SOB, urianry s/s, or any radiation of the pain. Has never had a dx of a hernia and no hx of abd sgy. MD Bolanos's team @ bedside on arrival, and want CT scan to r/o inguinal hernia v acute appendicitis.

## 2024-07-23 NOTE — ED PROVIDER NOTE - CARE PROVIDER_API CALL
Anna Bolanos   Surgery  [address], [address]  [place] [address]  Phone: [phone]  Fax: [phone]  Follow Up Time:

## 2024-07-24 VITALS
HEART RATE: 67 BPM | OXYGEN SATURATION: 96 % | TEMPERATURE: 98 F | DIASTOLIC BLOOD PRESSURE: 84 MMHG | SYSTOLIC BLOOD PRESSURE: 125 MMHG | RESPIRATION RATE: 18 BRPM

## 2024-07-24 PROCEDURE — 76856 US EXAM PELVIC COMPLETE: CPT

## 2024-07-24 PROCEDURE — 76830 TRANSVAGINAL US NON-OB: CPT

## 2024-07-24 PROCEDURE — 81003 URINALYSIS AUTO W/O SCOPE: CPT

## 2024-07-24 PROCEDURE — 85610 PROTHROMBIN TIME: CPT

## 2024-07-24 PROCEDURE — 86850 RBC ANTIBODY SCREEN: CPT

## 2024-07-24 PROCEDURE — 86901 BLOOD TYPING SEROLOGIC RH(D): CPT

## 2024-07-24 PROCEDURE — 85027 COMPLETE CBC AUTOMATED: CPT

## 2024-07-24 PROCEDURE — 86900 BLOOD TYPING SEROLOGIC ABO: CPT

## 2024-07-24 PROCEDURE — 83605 ASSAY OF LACTIC ACID: CPT

## 2024-07-24 PROCEDURE — 96376 TX/PRO/DX INJ SAME DRUG ADON: CPT

## 2024-07-24 PROCEDURE — 99284 EMERGENCY DEPT VISIT MOD MDM: CPT | Mod: 25

## 2024-07-24 PROCEDURE — 80053 COMPREHEN METABOLIC PANEL: CPT

## 2024-07-24 PROCEDURE — 36415 COLL VENOUS BLD VENIPUNCTURE: CPT

## 2024-07-24 PROCEDURE — 96374 THER/PROPH/DIAG INJ IV PUSH: CPT | Mod: XU

## 2024-07-24 PROCEDURE — 96375 TX/PRO/DX INJ NEW DRUG ADDON: CPT

## 2024-07-24 PROCEDURE — 85730 THROMBOPLASTIN TIME PARTIAL: CPT

## 2024-07-24 PROCEDURE — 96361 HYDRATE IV INFUSION ADD-ON: CPT

## 2024-07-24 PROCEDURE — 84702 CHORIONIC GONADOTROPIN TEST: CPT

## 2024-07-24 PROCEDURE — 83690 ASSAY OF LIPASE: CPT

## 2024-07-24 PROCEDURE — 74177 CT ABD & PELVIS W/CONTRAST: CPT | Mod: MC

## 2024-07-24 PROCEDURE — 87086 URINE CULTURE/COLONY COUNT: CPT

## 2024-07-24 RX ORDER — KETOROLAC TROMETHAMINE 30 MG/ML
15 INJECTION, SOLUTION INTRAMUSCULAR ONCE
Refills: 0 | Status: DISCONTINUED | OUTPATIENT
Start: 2024-07-24 | End: 2024-07-24

## 2024-07-24 RX ORDER — DIAZEPAM 10 MG/1
2 TABLET ORAL ONCE
Refills: 0 | Status: DISCONTINUED | OUTPATIENT
Start: 2024-07-24 | End: 2024-07-24

## 2024-07-24 RX ORDER — LIDOCAINE HCL 28 MG/G
1 GEL TOPICAL ONCE
Refills: 0 | Status: COMPLETED | OUTPATIENT
Start: 2024-07-24 | End: 2024-07-24

## 2024-07-24 RX ADMIN — Medication 650 MILLIGRAM(S): at 00:45

## 2024-07-24 RX ADMIN — KETOROLAC TROMETHAMINE 15 MILLIGRAM(S): 30 INJECTION, SOLUTION INTRAMUSCULAR at 01:39

## 2024-07-24 RX ADMIN — LIDOCAINE HCL 1 PATCH: 28 GEL TOPICAL at 01:39

## 2024-07-24 RX ADMIN — DIAZEPAM 2 MILLIGRAM(S): 10 TABLET ORAL at 00:52

## 2024-07-24 RX ADMIN — KETOROLAC TROMETHAMINE 15 MILLIGRAM(S): 30 INJECTION, SOLUTION INTRAMUSCULAR at 00:50

## 2024-07-24 RX ADMIN — Medication 1000 MILLILITER(S): at 00:45

## 2024-07-24 NOTE — CONSULT NOTE ADULT - ASSESSMENT
48yo Female patient with PMH HTN, presents reporting sudden onset, constant, sharp, 8/10 RLQ pain. Patient reported to urgent care where she was told "it could be a hernia" and was referred to ED. Vitals, physical exam, and lab findings unremarkable. CTAP showing normal appendic, no acute intra-abdominal findings; intrauterine device in situ with unremarkable bilateral adnexa. Transvaginal and pelvic US done with satisfactory position of intrauterine device with stem aligned within the endometrial cavity and cross bars extending laterally at fundus, left and right ovaries within normal limits with normal arterial and venous waveforms. Pain most consistent with musculoskeletal strain.    Recommendations:   - Follow up outpatient with Dr. Bolanos for elective right inguinal hernia repair  - Tylenol/Ibuprofen as needed for pain control  - Outpatient GYN follow up as needed  Plan discussed with chief resident and attending 50yo Female patient with PMH HTN, presents reporting sudden onset, constant, sharp, 8/10 RLQ pain. Patient reported to urgent care where she was told "it could be a hernia" and was referred to ED. Vitals, physical exam, and lab findings unremarkable for acute surgical issue at this time. CTAP showing normal appendix, no acute intra-abdominal findings; intrauterine device in situ with unremarkable bilateral adnexa. Transvaginal and pelvic US done with satisfactory position of intrauterine device with stem aligned within the endometrial cavity and cross bars extending laterally at fundus, left and right ovaries within normal limits with normal arterial and venous waveforms. Pain most consistent with reducible inguinal hernia.    Recommendations:   - Follow up outpatient with Dr. Bolanos for elective right inguinal hernia repair  - Tylenol/Ibuprofen as needed for pain control  - Outpatient GYN follow up as needed  Plan discussed with chief resident and attending

## 2024-07-24 NOTE — CONSULT NOTE ADULT - SUBJECTIVE AND OBJECTIVE BOX
50yo Female patient with PMH HTN, presents reporting sudden onset, constant, sharp, 8/10 RLQ pain. Patient reported to urgent care where she was told "it could be a hernia" and was referred to ED. Patient denies any N/V/D, CP, SOB, LE swelling, unintentional weight loss. Pt also reports x3-4 months of similar, intermittent episodes of shooting, RLQ pain lasting ~1 minute while at the gym/physical activity. Pt tolerating regular diet normally, last BM this AM, passing flatus.    In the ED, pt afebrile, nontachycardic, normotensive, and satting on RA. Labs unremarkbale with WBC 8.32 H/H 13.8/42.1, chemistry within normal limits. CTAP showing normal appendic, no acute intra-abdominal findings; intrauterine device in situ with unremarkable bilateral adnexa. Transvaginal and pelvic US done with satisfactory position of intrauterine device with stem aligned within the endometrial cavity and cross bars extending laterally at fundus, left and right ovaries within normal limits with normal arterial and venous waveforms.    PMH: HTN, IUD (2018)  PSHx: RT hand ortho surgery  Medications: Amlodipine 5 mg, benazapril 20 qd  Allergies: NA  Social Hx: Denies tobacco, alcohol, rec drug use  Family Hx: Denies family hx of IBS, Crohn's, UC, or colon cancer.  Last colonoscopy: 2/2024, WNL  Last EGD: NA    T(C): 36.8 (07-23-24 @ 17:29), Max: 36.8 (07-23-24 @ 17:25)  HR: 76 (07-23-24 @ 17:29) (76 - 76)  BP: 139/86 (07-23-24 @ 17:29) (139/86 - 139/86)  RR: 18 (07-23-24 @ 17:29) (18 - 18)  SpO2: 98% (07-23-24 @ 17:29) (98% - 98%)    Physical Exam  General: AAOx3, NAD, laying comfortably in bed  Cardio: S1,S2, No MRG  Pulm: Nonlabored breathing  Abdomen: Soft, RLQ TTP, ND, no rebound/guarding, negative dutta sign  Extremities: WWP, peripheral pulses appreciated      LABS:    LABS:                        13.8   8.32  )-----------( 262      ( 23 Jul 2024 17:53 )             42.1     07-23    141  |  102  |  12  ----------------------------<  91  4.5   |  28  |  0.70    Ca    10.0      23 Jul 2024 17:53    TPro  8.2  /  Alb  4.6  /  TBili  0.2  /  DBili  x   /  AST  27  /  ALT  25  /  AlkPhos  114  07-23    PT/INR - ( 23 Jul 2024 18:00 )   PT: 10.7 sec;   INR: 0.94          PTT - ( 23 Jul 2024 18:00 )  PTT:34.8 sec    CT Abdomen and Pelvis w/ IV Cont:   ACC: [id number] EXAM:  CT ABDOMEN AND PELVIS IC   ORDERED BY: CHERYL OROZCO     PROCEDURE DATE:  07/23/2024          INTERPRETATION:  CLINICAL INFORMATION: Right lower quadrant pain.    COMPARISON: None.    CONTRAST/COMPLICATIONS:  IV Contrast:Isovue 370  90 cc administered   10 cc discarded  Oral Contrast: NONE  Complications: None reported at time of study completion    PROCEDURE:  CT of the Abdomen and Pelvis was performed.  Sagittal and coronal reformats were performed.    FINDINGS:  LOWER CHEST: Within normal limits.    LIVER: Steatosis. Few subcentimeter hypodensities too small to   characterize.  BILE DUCTS: Normal caliber.  GALLBLADDER: Within normal limits.  SPLEEN: Within normal limits.  PANCREAS: Within normal limits.  ADRENALS: Within normal limits.  KIDNEYS/URETERS: Within normal limits.    BLADDER: Underdistended.  REPRODUCTIVE ORGANS: Intrauterine device in situ. Unremarkable bilateral   adnexa.    BOWEL: No bowel obstruction. Appendix is normal.  PERITONEUM/RETROPERITONEUM: Within normal limits.  VESSELS: Within normal limits.  LYMPH NODES: No lymphadenopathy.  ABDOMINAL WALL: Within normal limits.  BONES: Within normal limits.    IMPRESSION:  Normal appendix. No acute intra-abdominal findings.        --- End ofReport ---          PEÑA BARTLETT MD; Resident Radiologist  This document has been electronically signed.  DEANNA ESPINOZA MD; Attending Radiologist  This document has been electronically signed. Jul 23 2024  8:09PM (07-23-24 @ 19:30)      ACC: [id number] EXAM:  US PELVIC COMPLETE   ORDERED BY: DEL ZHANG     ACC: [id number] EXAM:  US TRANSVAGINAL   ORDERED BY: DEL LONNIEBONG     PROCEDURE DATE:  07/23/2024          INTERPRETATION:  CLINICAL INFORMATION: Right lower quadrant pain,   evaluate cyst.    LMP: 4 years    COMPARISON: CT abdomen and pelvis 7/23/2024    TECHNIQUE:  Endovaginal and transabdominal pelvic sonogram. Color and Spectral   Doppler was performed.    FINDINGS:  Uterus: 6.1 cm x 3.0 cm x 3.9 cm. Within normal limits. Satisfactory   position of intrauterine device with stem aligned within the endometrial   cavity and cross bars extending laterally at fundus.  Endometrium: 5 mm. Within normal limits.    Right ovary: 1.8 cm x 1.1 cm x 1.7 cm. Within normal limits. Normal   arterial and venous waveforms.  Left ovary: 1.7 cm x 1.0 cm x 1.3 cm. Within normal limits. Normal   arterial and venous waveforms.    Fluid: None.    IMPRESSION:  Normal pelvic sonogram.        --- End of Report ---          LUCIO BLANCHARD MD; Resident Radiologist  This document has been electronically signed.  STEFANIA MCKEON MD; Attending Radiologist  This document has been electronically signed. Jul 23 2024 11:56PM     48yo Female patient with PMH HTN, presents reporting sudden onset, constant, sharp, 8/10 RLQ pain. Patient reported to urgent care where she was told "it could be a hernia" and was referred to ED. Patient denies any N/V/D, CP, SOB, LE swelling, unintentional weight loss. Pt also reports x3-4 months of similar, intermittent episodes of shooting, RLQ pain lasting ~1 minute while at the gym/physical activity. Pt tolerating regular diet normally, last BM this AM, passing flatus.    In the ED, pt afebrile, nontachycardic, normotensive, and satting on RA. Labs unremarkable with WBC 8.32 H/H 13.8/42.1, chemistry within normal limits. CTAP showing normal appendic, no acute intra-abdominal findings; intrauterine device in situ with unremarkable bilateral adnexa. Transvaginal and pelvic US done with satisfactory position of intrauterine device with stem aligned within the endometrial cavity and cross bars extending laterally at fundus, left and right ovaries within normal limits with normal arterial and venous waveforms.    PMH: HTN, IUD (2018)  PSHx: RT hand ortho surgery  Medications: Amlodipine 5 mg, benazapril 20 qd  Allergies: NA  Social Hx: Denies tobacco, alcohol, rec drug use  Family Hx: Denies family hx of IBS, Crohn's, UC, or colon cancer.  Last colonoscopy: 2/2024, WNL  Last EGD: NA    T(C): 36.8 (07-23-24 @ 17:29), Max: 36.8 (07-23-24 @ 17:25)  HR: 76 (07-23-24 @ 17:29) (76 - 76)  BP: 139/86 (07-23-24 @ 17:29) (139/86 - 139/86)  RR: 18 (07-23-24 @ 17:29) (18 - 18)  SpO2: 98% (07-23-24 @ 17:29) (98% - 98%)    Physical Exam  General: AAOx3, NAD, laying comfortably in bed  Cardio: S1,S2, No MRG  Pulm: Nonlabored breathing  Abdomen: Soft, RLQ TTP, ND, no rebound/guarding, negative dutta sign  Extremities: WWP, peripheral pulses appreciated      LABS:    LABS:                        13.8   8.32  )-----------( 262      ( 23 Jul 2024 17:53 )             42.1     07-23    141  |  102  |  12  ----------------------------<  91  4.5   |  28  |  0.70    Ca    10.0      23 Jul 2024 17:53    TPro  8.2  /  Alb  4.6  /  TBili  0.2  /  DBili  x   /  AST  27  /  ALT  25  /  AlkPhos  114  07-23    PT/INR - ( 23 Jul 2024 18:00 )   PT: 10.7 sec;   INR: 0.94          PTT - ( 23 Jul 2024 18:00 )  PTT:34.8 sec    CT Abdomen and Pelvis w/ IV Cont:   ACC: [id number] EXAM:  CT ABDOMEN AND PELVIS IC   ORDERED BY: CHERYL OROZCO     PROCEDURE DATE:  07/23/2024          INTERPRETATION:  CLINICAL INFORMATION: Right lower quadrant pain.    COMPARISON: None.    CONTRAST/COMPLICATIONS:  IV Contrast:Isovue 370  90 cc administered   10 cc discarded  Oral Contrast: NONE  Complications: None reported at time of study completion    PROCEDURE:  CT of the Abdomen and Pelvis was performed.  Sagittal and coronal reformats were performed.    FINDINGS:  LOWER CHEST: Within normal limits.    LIVER: Steatosis. Few subcentimeter hypodensities too small to   characterize.  BILE DUCTS: Normal caliber.  GALLBLADDER: Within normal limits.  SPLEEN: Within normal limits.  PANCREAS: Within normal limits.  ADRENALS: Within normal limits.  KIDNEYS/URETERS: Within normal limits.    BLADDER: Underdistended.  REPRODUCTIVE ORGANS: Intrauterine device in situ. Unremarkable bilateral   adnexa.    BOWEL: No bowel obstruction. Appendix is normal.  PERITONEUM/RETROPERITONEUM: Within normal limits.  VESSELS: Within normal limits.  LYMPH NODES: No lymphadenopathy.  ABDOMINAL WALL: Within normal limits.  BONES: Within normal limits.    IMPRESSION:  Normal appendix. No acute intra-abdominal findings.        --- End ofReport ---          PEÑA BARTLETT MD; Resident Radiologist  This document has been electronically signed.  DEANNA ESPINOZA MD; Attending Radiologist  This document has been electronically signed. Jul 23 2024  8:09PM (07-23-24 @ 19:30)      ACC: [id number] EXAM:  US PELVIC COMPLETE   ORDERED BY: DEL ZHANG     ACC: [id number] EXAM:  US TRANSVAGINAL   ORDERED BY: DEL HEARTFINN     PROCEDURE DATE:  07/23/2024          INTERPRETATION:  CLINICAL INFORMATION: Right lower quadrant pain,   evaluate cyst.    LMP: 4 years    COMPARISON: CT abdomen and pelvis 7/23/2024    TECHNIQUE:  Endovaginal and transabdominal pelvic sonogram. Color and Spectral   Doppler was performed.    FINDINGS:  Uterus: 6.1 cm x 3.0 cm x 3.9 cm. Within normal limits. Satisfactory   position of intrauterine device with stem aligned within the endometrial   cavity and cross bars extending laterally at fundus.  Endometrium: 5 mm. Within normal limits.    Right ovary: 1.8 cm x 1.1 cm x 1.7 cm. Within normal limits. Normal   arterial and venous waveforms.  Left ovary: 1.7 cm x 1.0 cm x 1.3 cm. Within normal limits. Normal   arterial and venous waveforms.    Fluid: None.    IMPRESSION:  Normal pelvic sonogram.        --- End of Report ---          LUCIO BLANCHARD MD; Resident Radiologist  This document has been electronically signed.  STEFANIA MCKEON MD; Attending Radiologist  This document has been electronically signed. Jul 23 2024 11:56PM

## 2024-07-24 NOTE — CHART NOTE - NSCHARTNOTEFT_GEN_A_CORE
Senior Surgery Resident Note: Please refer to consult note for full history. Yolanda Zambrano is a 49F w/ pmh of HTN and no previous abdominal surgeries present from urgent care for spontaneous 8/10 sharp RLQ pain starting earlier today, with radiation to ther groin. Pain is reproducible when exercising. Endorses nausea without emesis, and flatus and BM this AM. Pain is relieved when lying down. Cscope 2/2024 was wnl. Currently afebrile with stable vitals. Abdomen is soft, no rebound or guarding, moderate TTP over right superficial inguinal ring, negative rovsing, negative straight leg. Labs wnl, negative HCG and UA. CT A/P- negative for acute appendicitis or acute intrabdominal process. TVUS- normal. Discussed likely diangosis of right inguinal hernia without incarceration or strangulation and recommended analgesia OTC tylenol or ibuprofen. Ok to discharge pt from surgical perspective with outpt f/u with Dr. Bolanos for elective repair of inguinal hernia. Plan discussed with pt and surgeon and all were in agreement.

## 2024-07-25 ENCOUNTER — TRANSCRIPTION ENCOUNTER (OUTPATIENT)
Age: 49
End: 2024-07-25

## 2024-07-25 LAB
CULTURE RESULTS: SIGNIFICANT CHANGE UP
SPECIMEN SOURCE: SIGNIFICANT CHANGE UP

## 2025-03-13 ENCOUNTER — APPOINTMENT (OUTPATIENT)
Dept: ORTHOPEDIC SURGERY | Facility: CLINIC | Age: 50
End: 2025-03-13
Payer: COMMERCIAL

## 2025-03-13 VITALS
SYSTOLIC BLOOD PRESSURE: 125 MMHG | RESPIRATION RATE: 16 BRPM | TEMPERATURE: 97.9 F | HEIGHT: 65 IN | DIASTOLIC BLOOD PRESSURE: 83 MMHG | OXYGEN SATURATION: 98 % | HEART RATE: 74 BPM

## 2025-03-13 DIAGNOSIS — M25.512 PAIN IN LEFT SHOULDER: ICD-10-CM

## 2025-03-13 PROCEDURE — 73030 X-RAY EXAM OF SHOULDER: CPT | Mod: LT

## 2025-03-13 PROCEDURE — 99204 OFFICE O/P NEW MOD 45 MIN: CPT
